# Patient Record
Sex: MALE | Race: WHITE | NOT HISPANIC OR LATINO | URBAN - METROPOLITAN AREA
[De-identification: names, ages, dates, MRNs, and addresses within clinical notes are randomized per-mention and may not be internally consistent; named-entity substitution may affect disease eponyms.]

---

## 2018-03-14 ENCOUNTER — OFFICE VISIT CONVERTED (OUTPATIENT)
Dept: UROLOGY | Facility: CLINIC | Age: 83
End: 2018-03-14
Attending: UROLOGY

## 2018-09-14 ENCOUNTER — OFFICE VISIT CONVERTED (OUTPATIENT)
Dept: UROLOGY | Facility: CLINIC | Age: 83
End: 2018-09-14
Attending: NURSE PRACTITIONER

## 2019-10-14 ENCOUNTER — CONVERSION ENCOUNTER (OUTPATIENT)
Dept: UROLOGY | Facility: CLINIC | Age: 84
End: 2019-10-14

## 2019-10-14 ENCOUNTER — HOSPITAL ENCOUNTER (OUTPATIENT)
Dept: OTHER | Facility: HOSPITAL | Age: 84
Discharge: HOME | End: 2019-10-14
Attending: NURSE PRACTITIONER

## 2019-10-14 ENCOUNTER — OFFICE VISIT CONVERTED (OUTPATIENT)
Dept: UROLOGY | Facility: CLINIC | Age: 84
End: 2019-10-14
Attending: NURSE PRACTITIONER

## 2019-10-14 LAB
BASOPHILS # BLD AUTO: 0.05 10*3/UL (ref 0–0.2)
BASOPHILS NFR BLD AUTO: 0.5 % (ref 0–3)
CONV ABS IMM GRAN: 0.02 10*3/UL (ref 0–0.2)
CONV IMMATURE GRAN: 0.2 % (ref 0–1.8)
DEPRECATED RDW RBC AUTO: 47.4 FL (ref 35.1–43.9)
EOSINOPHIL # BLD AUTO: 0.06 10*3/UL (ref 0–0.7)
EOSINOPHIL # BLD AUTO: 0.6 % (ref 0–7)
ERYTHROCYTE [DISTWIDTH] IN BLOOD BY AUTOMATED COUNT: 12.8 % (ref 11.6–14.4)
FSH SERPL-ACNC: 3.8 M[IU]/ML
HCT VFR BLD AUTO: 43 % (ref 42–52)
HGB BLD-MCNC: 14.1 G/DL (ref 14–18)
LH SERPL-ACNC: 2 M[IU]/ML
LYMPHOCYTES # BLD AUTO: 2.09 10*3/UL (ref 1–5)
LYMPHOCYTES NFR BLD AUTO: 19.6 % (ref 20–45)
MCH RBC QN AUTO: 32.8 PG (ref 27–31)
MCHC RBC AUTO-ENTMCNC: 32.8 G/DL (ref 33–37)
MCV RBC AUTO: 100 FL (ref 80–96)
MONOCYTES # BLD AUTO: 0.9 10*3/UL (ref 0.2–1.2)
MONOCYTES NFR BLD AUTO: 8.5 % (ref 3–10)
NEUTROPHILS # BLD AUTO: 7.53 10*3/UL (ref 2–8)
NEUTROPHILS NFR BLD AUTO: 70.6 % (ref 30–85)
NRBC CBCN: 0 % (ref 0–0.7)
PLATELET # BLD AUTO: 382 10*3/UL (ref 130–400)
PMV BLD AUTO: 9.4 FL (ref 9.4–12.4)
PSA SERPL-MCNC: 4.62 NG/ML (ref 0–4)
RBC # BLD AUTO: 4.3 10*6/UL (ref 4.7–6.1)
WBC # BLD AUTO: 10.65 10*3/UL (ref 4.8–10.8)

## 2019-10-19 LAB
BIOAVAILABLE TESTOSTERONE, %: 16.6 %
CONV TESTOSTERONE, FREE: 40 PG/ML
SHBG SERPL-SCNC: 95.2 NMOL/L
TESTOST FREE MFR SERPL: 0.8 %
TESTOST SERPL-MCNC: 499 NG/DL
TESTOSTERONE.FREE+WB SERPL-MCNC: 83 NG/DL

## 2019-10-23 ENCOUNTER — OFFICE VISIT CONVERTED (OUTPATIENT)
Dept: UROLOGY | Facility: CLINIC | Age: 84
End: 2019-10-23
Attending: UROLOGY

## 2020-01-16 ENCOUNTER — HOSPITAL ENCOUNTER (OUTPATIENT)
Dept: UROLOGY | Facility: CLINIC | Age: 85
Discharge: HOME | End: 2020-01-16
Attending: UROLOGY

## 2020-01-16 LAB — PSA SERPL-MCNC: 3.46 NG/ML (ref 0–4)

## 2020-01-23 ENCOUNTER — OFFICE VISIT CONVERTED (OUTPATIENT)
Dept: UROLOGY | Facility: CLINIC | Age: 85
End: 2020-01-23
Attending: UROLOGY

## 2020-01-23 ENCOUNTER — CONVERSION ENCOUNTER (OUTPATIENT)
Dept: UROLOGY | Facility: CLINIC | Age: 85
End: 2020-01-23

## 2020-07-30 ENCOUNTER — CONVERSION ENCOUNTER (OUTPATIENT)
Dept: UROLOGY | Facility: CLINIC | Age: 85
End: 2020-07-30

## 2020-07-30 ENCOUNTER — OFFICE VISIT CONVERTED (OUTPATIENT)
Dept: UROLOGY | Facility: CLINIC | Age: 85
End: 2020-07-30
Attending: UROLOGY

## 2021-05-13 NOTE — PROGRESS NOTES
Progress Note      Patient Name: Hany Jasmine   Patient ID: [id number]   Sex: Male   YOB: 1935    Primary Care Provider: Barbara aLguna MD   Referring Provider: Barbara Laguna MD    Visit Date: July 30, 2020    Provider: Mario Perales MD   Location: [facility]   Location Address: [address] [place] [address], [address]  [place], [place]  [id number]   Location Phone: [phone]          Chief Complaint  · Recheck prostate      History Of Present Illness  Hany Jasmine is a 84 year old /White male who comes in for a scheduled follow-up visit for BPH.        1/16/20 PSA was 3.46    AUA is 1/35.  Patient is doing good.  Nocturia 0-1.  No frequency in the daytime.  No nausea vomiting and is not losing weight.       Past Medical History  BPH (benign prostatic hyperplasia); ED (erectile dysfunction); Hypogonadism, male; Pancreatic cyst; Renal cancer, right; Renal cyst         Past Surgical History  carpal tunnel; Cholecystectomy; Hernia; Nephrectomy, Patial Right; Prostate Surgery         Medication List  AndroGel 1.62 % (20.25 mg/1.25 gram) transdermal gel in packet; aspirin 81 mg oral tablet,delayed release (DR/EC); doxazosin 8 mg oral tablet; hydrochlorothiazide 25 mg oral tablet; nifedipine 90 mg oral tablet extended release 24hr; Symbicort 160-4.5 mcg/actuation inhalation HFA aerosol inhaler; Tricor 145 mg Oral tablet         Allergy List  NO KNOWN DRUG ALLERGIES       Allergies Reconciled  Family Medical History  *No Known Family History         Social History  Alcohol (Never); Tobacco (Never)         Review of Systems  · Constitutional  o Denies  o : fever, headache, chills  · Eyes  o Denies  o : eye pain, double vision, blurred vision  · HENT  o Denies  o : sinus problems, sore throat, ear infection  · Cardiovascular  o Denies  o : chest pain, high blood pressure, varicosities  · Respiratory  o Admits  o : shortness of breath  o Denies  o : wheezing, frequent  "cough  · Gastrointestinal  o Denies  o : nausea, vomiting, heartburn, indigestion, abdominal pain  · Genitourinary  o Denies  o : urgency, frequency, urinary retention, painful urination  · Integument  o Denies  o : rash, itching, boils  · Neurologic  o Denies  o : tingling or numbness, tremors, dizzy spells  · Musculoskeletal  o Denies  o : joint pain, neck pain, back pain  · Endocrine  o Denies  o : cold intolerance, heat intolerance, tired, excessive thirst, sluggish  · Psychiatric  o Admits  o : feels satisfied with life  o Denies  o : severe depression, concerns with hurting themselves  · Heme-Lymph  o Denies  o : swollen glands, blood clotting problems  · Allergic-Immunologic  o Denies  o : sinus allergy symptoms, hay fever      Vitals  Date Time BP Position Site L\R Cuff Size HR RR TEMP (F) WT  HT  BMI kg/m2 BSA m2 O2 Sat HC       07/30/2020 09:01 AM         190lbs 0oz 5'  6\" 30.67 2     07/30/2020 09:01 /54 Sitting    72 - R  96.9               Physical Examination  · Constitutional  o Appearance  o : 84-year-old white male with little obese and does have some shortness of breath  · Neck  o Thyroid  o : gland size normal, nontender, no nodules or masses present on palpation  · Respiratory  o Respiratory Effort  o : breathing unlabored  o Inspection of Chest  o : normal appearance, no retractions  o Auscultation of Lungs  o : normal breath sounds throughout  · Cardiovascular  o Heart  o :   § Auscultation of Heart  § : regular rate and rhythm, no murmurs, gallops or rubs  o Peripheral Vascular System  o : No abnormalities  · Gastrointestinal  o Liver and spleen  o : no abnormalities  · Genitourinary  o Bladder  o : no abnormalities  o Penis  o : Normal appearance without lesion, discharge or masses.  o Urethral Meatus  o : no abnormalities  o Scrotum and Scrotal Contents  o :   § Scrotum  § : no abnormalities  § Epididymides  § : no abnormalities  § Testes  § : no abnormalities  o Digital Rectal " Examination  o :   § Prostate  § : nontender to palpation, size normal, no nodules present, consistency normal  · Lymphatic  o Groin  o : no lymphadenopathy present, normal size  · Skin and Subcutaneous Tissue  o General Inspection  o : no lesions present, no areas of discoloration, skin turgor normal, texture normal  · Neurologic  o Mental Status Examination  o : grossly oriented to person, place and time  o Gait and Station  o : normal gait, able to stand without difficulty  · Psychiatric  o Mood and Affect  o : mood normal, affect appropriate      Figure 1.0: Pain Rating Scale-[place]         Results  · In-Office Procedures  o Lab procedure  § Automated dipstick urinalysis with microscopy (97523)   § Color Ur: Yellow   § Clarity Ur: Clear   § Glucose Ur Ql Strip: 100 mg/dL   § Bilirub Ur Ql Strip: Negative   § Ketones Ur Ql Strip: Negative   § Sp Gr Ur Qn: 1.025   § Hgb Ur Ql Strip: Negative   § pH Ur-LsCnc: 7.0   § Prot Ur Ql Strip: Negative   § Urobilinogen Ur Strip-mCnc: 1.0 E.U./dL   § Nitrite Ur Ql Strip: Negative   § WBC Est Ur Ql Strip: Negative   § RBC UrnS Qn HPF: 0   § WBC UrnS Qn HPF: 0   § Bacteria UrnS Qn HPF: 0   § Crystals UrnS Qn HPF: 0   § Epithelial Cells (non renal): 0 /HPF  § Epithelial Cells (renal): 0       Assessment  · Hypogonadism, male     257.2/E29.1  · BPH (benign prostatic hyperplasia)     600.00/N40.0    Problems Reconciled  Plan  · Medications  o Medications have been Reconciled  o Transition of Care or Provider Policy  · Instructions  o Patient is doing fine for his age. Informed him and his wife about glycosuria  o . we will recheck him in 6 months time            Electronically Signed by: Mario Perales MD -Author on July 30, 2020 10:27:31 AM

## 2021-05-15 VITALS
DIASTOLIC BLOOD PRESSURE: 56 MMHG | HEIGHT: 66 IN | SYSTOLIC BLOOD PRESSURE: 154 MMHG | WEIGHT: 202 LBS | HEART RATE: 67 BPM | BODY MASS INDEX: 32.47 KG/M2 | TEMPERATURE: 98.5 F

## 2021-05-15 VITALS
SYSTOLIC BLOOD PRESSURE: 152 MMHG | HEIGHT: 66 IN | HEART RATE: 76 BPM | BODY MASS INDEX: 29.73 KG/M2 | DIASTOLIC BLOOD PRESSURE: 69 MMHG | WEIGHT: 185 LBS

## 2021-05-15 VITALS
WEIGHT: 190 LBS | TEMPERATURE: 96.9 F | HEIGHT: 66 IN | SYSTOLIC BLOOD PRESSURE: 151 MMHG | BODY MASS INDEX: 30.53 KG/M2 | DIASTOLIC BLOOD PRESSURE: 54 MMHG | HEART RATE: 72 BPM

## 2021-05-15 VITALS — HEART RATE: 68 BPM | SYSTOLIC BLOOD PRESSURE: 148 MMHG | TEMPERATURE: 98.3 F | DIASTOLIC BLOOD PRESSURE: 61 MMHG

## 2021-05-16 VITALS
BODY MASS INDEX: 31.39 KG/M2 | TEMPERATURE: 98.4 F | WEIGHT: 200 LBS | DIASTOLIC BLOOD PRESSURE: 81 MMHG | HEART RATE: 86 BPM | HEIGHT: 67 IN | SYSTOLIC BLOOD PRESSURE: 163 MMHG

## 2021-05-16 VITALS
HEIGHT: 66 IN | BODY MASS INDEX: 32.14 KG/M2 | HEART RATE: 78 BPM | DIASTOLIC BLOOD PRESSURE: 65 MMHG | TEMPERATURE: 98.9 F | WEIGHT: 200 LBS | SYSTOLIC BLOOD PRESSURE: 161 MMHG

## 2021-05-19 ENCOUNTER — OFFICE VISIT CONVERTED (OUTPATIENT)
Dept: UROLOGY | Facility: CLINIC | Age: 86
End: 2021-05-19
Attending: UROLOGY

## 2021-05-19 ENCOUNTER — CONVERSION ENCOUNTER (OUTPATIENT)
Dept: UROLOGY | Facility: CLINIC | Age: 86
End: 2021-05-19

## 2021-05-19 LAB
BILIRUB UR QL STRIP: NEGATIVE
COLOR UR: YELLOW
CONV BACTERIA IN URINE MICRO: 0
CONV CALCIUM OXALATE CRYSTALS /HPF IN URINE SEDIMENT BY MICROSCOPY: 0
CONV CLARITY OF URINE: CLEAR
CONV PROTEIN IN URINE BY AUTOMATED TEST STRIP: NEGATIVE
CONV UROBILINOGEN IN URINE BY AUTOMATED TEST STRIP: NORMAL
GLUCOSE UR QL: NEGATIVE
HGB UR QL STRIP: NEGATIVE
KETONES UR QL STRIP: NEGATIVE
LEUKOCYTE ESTERASE UR QL STRIP: NEGATIVE
NITRITE UR QL STRIP: NEGATIVE
PH UR STRIP.AUTO: 5.5 [PH]
RBC #/AREA URNS HPF: 0 /[HPF]
RENAL EPI CELLS #/AREA URNS HPF: 0 /[HPF]
SP GR UR: 1.02
SQUAMOUS SPT QL MICRO: 0
WBC #/AREA URNS HPF: 0 /[HPF]

## 2021-06-05 NOTE — PROGRESS NOTES
"   Progress Note      Patient Name: Hany Jasmine   Patient ID: [id number]   Sex: Male   YOB: 1935    Primary Care Provider: Barbara Laguna MD   Referring Provider: Barbara Laguna MD    Visit Date: May 19, 2021    Provider: Mario Perales MD   Location: [facility]   Location Address: [address] [place] [address], [address] [place] [address]  [id number]   Location Phone: [phone]          Chief Complaint  · \"Returning for a prostate check\"      History Of Present Illness  The patient is a 85 year old /White male , who presents for a follow up for BPH. Patient is urinating with no problem. He gets up only once at nighttime. No burning in his stream is good           Past Medical History  BPH (benign prostatic hyperplasia); ED (erectile dysfunction); Hypogonadism, male; Pancreatic cyst; Renal cancer, right; Renal cyst         Past Surgical History  carpal tunnel; Cholecystectomy; Hernia; Nephrectomy, Patial Right; Prostate Surgery         Medication List  AndroGel 1.62 % (20.25 mg/1.25 gram) transdermal gel in packet; aspirin 81 mg oral tablet,delayed release (DR/EC); doxazosin 8 mg oral tablet; hydrochlorothiazide 25 mg oral tablet; nifedipine 90 mg oral tablet extended release 24hr; Symbicort 160-4.5 mcg/actuation inhalation HFA aerosol inhaler; Tricor 145 mg Oral tablet         Allergy List  NO KNOWN DRUG ALLERGIES         Family Medical History  *No Known Family History         Social History  Alcohol (Never); Tobacco (Never)         Review of Systems  · Constitutional  o Denies  o : fatigue, night sweats  · Eyes  o Denies  o : double vision, blurred vision  · HENT  o Denies  o : vertigo, recent head injury  · Breasts  o Denies  o : abnormal changes in breast size, additional breast symptoms except as noted in the HPI  · Cardiovascular  o Denies  o : chest pain, irregular heart beats  · Respiratory  o Denies  o : shortness of breath, productive cough  · Gastrointestinal  o Denies  o : " "nausea, vomiting  · Genitourinary  o Admits  o : nocturia  o Denies  o : urgency, frequency, dysuria, hematuria, incontinence, urinary retention, decreased stream, post-void dribbling  · Integument  o Denies  o : hair growth change, new skin lesions  · Neurologic  o Denies  o : altered mental status, seizures  · Musculoskeletal  o Denies  o : joint swelling, limitation of motion  · Endocrine  o Denies  o : cold intolerance, heat intolerance  · Heme-Lymph  o Denies  o : petechiae, lymph node enlargement or tenderness  · Allergic-Immunologic  o Denies  o : frequent illnesses      Vitals  Date Time BP Position Site L\R Cuff Size HR RR TEMP (F) WT  HT  BMI kg/m2 BSA m2 O2 Sat FR L/min FiO2 HC       05/19/2021 03:01 /54 Sitting    75 - R  97.6 201lbs 8oz 5'  6\" 32.52 2.06             Physical Examination  · Constitutional  o Appearance  o : Well nourished, well developed patient in no acute distress. Ambulating without difficulty.  · Neck  o Thyroid  o : Normal size without tenderness, nodules or masses  · Respiratory  o Respiratory Effort  o : Breathing is unlabored without accessory muscle use  o Inspection of Chest  o : normal appearance, no retractions  o Auscultation of Lungs  o : Normal breath sounds  · Cardiovascular  o Heart  o :   § Auscultation of Heart  § : regular rate and rhythm, no murmurs, gallops or rubs  o Peripheral Vascular System  o : No abnormalities  · Gastrointestinal  o Abdominal Examination  o : Scaphoid abdomen which is non-tender to palpation with normal tone and without rigidity or guarding. Normal bowel sounds. No masses present.  o Liver and spleen  o : No hepatomegaly present. Liver is non-tender to palpation and spleen is not palpable.  o Hernias  o : No abdominal wall hernias are present.  · Genitourinary  o Bladder  o : no abnormalities  o Penis  o : Normal appearance without lesion, discharge or masses.  o Urethral Meatus  o : no abnormalities  o Scrotum and Scrotal Contents  o : "   § Scrotum  § : no abnormalities  § Epididymides  § : no abnormalities  § Testes  § : no abnormalities  o Digital Rectal Examination  o :   § Prostate  § : nontender to palpation, size normal, no nodules present, consistency normal  · Skin and Subcutaneous Tissue  o General Inspection  o : No rashes, lesions or areas of discoloration present. Skin turgor is normal.  o General Palpation  o : No abnormalities, masses or tenderness on palpation.  · Neurologic  o Mental Status Examination  o : grossly oriented to person, place and time  o Gait and Station  o : normal gait, able to stand without difficulty  · Psychiatric  o Mood and Affect  o : mood normal, affect appropriate      Figure 1.0: Pain Rating Scale-[place]         Results  · In-Office Procedures  o Lab procedure  § Automated dipstick urinalysis with microscopy (22144)   § Color Ur: Yellow   § Clarity Ur: Clear   § Glucose Ur Ql Strip: Negative   § Bilirub Ur Ql Strip: Negative   § Ketones Ur Ql Strip: Negative   § Sp Gr Ur Qn: 1.025   § Hgb Ur Ql Strip: Negative   § pH Ur-LsCnc: 5.5   § Prot Ur Ql Strip: Negative   § Urobilinogen Ur Strip-mCnc: 0.2 E.U./dL   § Nitrite Ur Ql Strip: Negative   § WBC Est Ur Ql Strip: Negative   § RBC UrnS Qn HPF: 0   § WBC UrnS Qn HPF: 0   § Bacteria UrnS Qn HPF: 0   § Crystals UrnS Qn HPF: 0   § Epithelial Cells (non renal): 0 /HPF  § Epithelial Cells (renal): 0       Assessment  · BPH with Urinary Obstruction       Benign prostatic hyperplasia with lower urinary tract symptoms     600.01/N40.1  Other obstructive and reflux uropathy     600.01/N13.8      Plan  · Medications  o Medications have been Reconciled  o Transition of Care or Provider Policy  · Instructions  o Patient is doing fine and we will recheck him in 6 months with PSA. His PSA was elevated last year  o Electronically Identified Patient Education Materials Provided Electronically            Electronically Signed by: Mario Perales MD -Author on May 19, 2021  03:23:10 PM

## 2021-07-15 VITALS
TEMPERATURE: 97.6 F | BODY MASS INDEX: 32.38 KG/M2 | HEART RATE: 75 BPM | SYSTOLIC BLOOD PRESSURE: 146 MMHG | HEIGHT: 66 IN | WEIGHT: 201.5 LBS | DIASTOLIC BLOOD PRESSURE: 54 MMHG

## 2021-11-19 ENCOUNTER — OFFICE VISIT (OUTPATIENT)
Dept: UROLOGY | Facility: CLINIC | Age: 86
End: 2021-11-19

## 2021-11-19 VITALS
DIASTOLIC BLOOD PRESSURE: 58 MMHG | HEIGHT: 66 IN | TEMPERATURE: 97.6 F | HEART RATE: 79 BPM | SYSTOLIC BLOOD PRESSURE: 145 MMHG | WEIGHT: 201 LBS | BODY MASS INDEX: 32.3 KG/M2

## 2021-11-19 DIAGNOSIS — N40.0 BENIGN PROSTATIC HYPERPLASIA, UNSPECIFIED WHETHER LOWER URINARY TRACT SYMPTOMS PRESENT: Primary | ICD-10-CM

## 2021-11-19 DIAGNOSIS — Z85.528 HISTORY OF KIDNEY CANCER: ICD-10-CM

## 2021-11-19 LAB
BACTERIA UR QL AUTO: NORMAL /HPF
BILIRUB BLD-MCNC: NEGATIVE MG/DL
CLARITY, POC: CLEAR
COLOR UR: YELLOW
EPI CELLS #/AREA URNS HPF: 0 /[HPF]
GLUCOSE UR STRIP-MCNC: NEGATIVE MG/DL
KETONES UR QL: NEGATIVE
LEUKOCYTE EST, POC: NEGATIVE
NITRITE UR-MCNC: NEGATIVE MG/ML
PH UR: 7 [PH] (ref 5–8)
PROT UR STRIP-MCNC: NEGATIVE MG/DL
RBC # UR STRIP: NEGATIVE /UL
RBC # UR STRIP: NORMAL /HPF
RENAL EPITHELIAL, POC: 0
SP GR UR: 1.02 (ref 1–1.03)
UNIDENT CRYS URNS QL MICRO: NORMAL /HPF
UROBILINOGEN UR QL: NORMAL
WBC # UR STRIP: NORMAL /HPF

## 2021-11-19 PROCEDURE — 81003 URINALYSIS AUTO W/O SCOPE: CPT | Performed by: UROLOGY

## 2021-11-19 PROCEDURE — 99213 OFFICE O/P EST LOW 20 MIN: CPT | Performed by: UROLOGY

## 2021-11-19 RX ORDER — DOXAZOSIN 8 MG/1
TABLET ORAL
COMMUNITY

## 2021-11-19 RX ORDER — NIFEDIPINE 90 MG/1
TABLET, EXTENDED RELEASE ORAL
COMMUNITY

## 2021-11-19 RX ORDER — HYDROCHLOROTHIAZIDE 25 MG/1
TABLET ORAL
COMMUNITY

## 2021-11-19 RX ORDER — GLIPIZIDE 5 MG/1
TABLET ORAL
COMMUNITY
Start: 2021-09-27

## 2021-11-19 RX ORDER — ASPIRIN 81 MG/1
TABLET ORAL
COMMUNITY

## 2021-11-19 RX ORDER — CARVEDILOL 6.25 MG/1
TABLET ORAL
COMMUNITY
Start: 2021-10-08

## 2021-11-19 RX ORDER — ALBUTEROL SULFATE 90 UG/1
AEROSOL, METERED RESPIRATORY (INHALATION)
COMMUNITY
Start: 2021-09-04

## 2021-11-19 RX ORDER — BUDESONIDE AND FORMOTEROL FUMARATE DIHYDRATE 160; 4.5 UG/1; UG/1
AEROSOL RESPIRATORY (INHALATION)
COMMUNITY

## 2021-11-19 RX ORDER — FENOFIBRATE 160 MG/1
TABLET ORAL
COMMUNITY
Start: 2021-09-23

## 2021-11-19 RX ORDER — FENOFIBRATE 145 MG/1
TABLET, COATED ORAL
COMMUNITY

## 2021-11-19 NOTE — PROGRESS NOTES
"Chief Complaint  Benign Prostatic Hypertrophy    Follow-up for chronic disease of BPH    Subjective No acute problems   {Problem List  Visit Diagnosis   Encounters  Notes  Medications  Labs  Result Review Imaging  Media :23}     Hany Jasmine presents to [facility]  History of Present Illness    Patient is doing very well and has no nocturia. She has no frequency or dysuria. No gross hematuria. His AUA score is 6/35. Patient was on androgens which increased his PSA but he is off testosterone at this time.    Patient has history of right kidney cancer which was removed several years ago and is in remission    Objective No acute distress  Vital Signs:   /58   Pulse 79   Temp 97.6 °F (36.4 °C)   Ht 167.6 cm (66\")   Wt 91.2 kg (201 lb)   BMI 32.44 kg/m²     No Known Allergies   Review of Systems no change from last time    Physical Exam  Constitutional:       General: He is not in acute distress.     Appearance: Normal appearance. He is obese. He is not ill-appearing, toxic-appearing or diaphoretic.   HENT:      Head: Normocephalic and atraumatic.   Eyes:      Pupils: Pupils are equal, round, and reactive to light.   Cardiovascular:      Rate and Rhythm: Normal rate and regular rhythm.      Pulses: Normal pulses.      Heart sounds: Normal heart sounds. No murmur heard.      Pulmonary:      Effort: Pulmonary effort is normal. No respiratory distress.      Breath sounds: Normal breath sounds. No rhonchi or rales.   Abdominal:      General: Abdomen is flat. There is no distension.      Palpations: Abdomen is soft.      Tenderness: There is no abdominal tenderness. There is no right CVA tenderness, left CVA tenderness or guarding.   Genitourinary:     Penis: Normal.       Testes: Normal.      Prostate: Normal.      Rectum: Normal.   Musculoskeletal:         General: No swelling. Normal range of motion.      Cervical back: Normal range of motion and neck supple. No rigidity or " tenderness.   Skin:     General: Skin is warm.      Coloration: Skin is not jaundiced.   Neurological:      General: No focal deficit present.      Mental Status: He is alert and oriented to person, place, and time.      Motor: No weakness.      Gait: Gait normal.   Psychiatric:         Mood and Affect: Mood normal.         Behavior: Behavior normal.         Thought Content: Thought content normal.         Judgment: Judgment normal.        Result Review :                 Assessment and Plan    Diagnoses and all orders for this visit:    1. Benign prostatic hyperplasia, unspecified whether lower urinary tract symptoms present (Primary)  -     POC Urinalysis Dipstick    2. History of kidney cancer    Patient is in remission and does not have any voiding difficulties. We will recheck him in 6 months to make sure his obstructive uropathy is not any worse and is now developing prostate cancer    Follow Up   No follow-ups on file.  Patient was given instructions and counseling regarding his condition or for health maintenance advice. Please see specific information pulled into the AVS if appropriate.     Mario Perales MD

## 2022-01-01 ENCOUNTER — OFFICE VISIT (OUTPATIENT)
Dept: UROLOGY | Facility: CLINIC | Age: 87
End: 2022-01-01

## 2022-01-01 VITALS
TEMPERATURE: 98.7 F | HEIGHT: 66 IN | HEART RATE: 70 BPM | DIASTOLIC BLOOD PRESSURE: 65 MMHG | BODY MASS INDEX: 32.47 KG/M2 | WEIGHT: 202 LBS | SYSTOLIC BLOOD PRESSURE: 142 MMHG

## 2022-01-01 DIAGNOSIS — N40.0 BENIGN PROSTATIC HYPERPLASIA, UNSPECIFIED WHETHER LOWER URINARY TRACT SYMPTOMS PRESENT: Primary | ICD-10-CM

## 2022-01-01 DIAGNOSIS — Z85.528 HISTORY OF KIDNEY CANCER: ICD-10-CM

## 2022-01-01 LAB
BILIRUB BLD-MCNC: NEGATIVE MG/DL
CLARITY, POC: CLEAR
COLOR UR: YELLOW
GLUCOSE UR STRIP-MCNC: ABNORMAL MG/DL
KETONES UR QL: NEGATIVE
LEUKOCYTE EST, POC: NEGATIVE
NITRITE UR-MCNC: NEGATIVE MG/ML
PH UR: 7 [PH] (ref 5–8)
PROT UR STRIP-MCNC: NEGATIVE MG/DL
RBC # UR STRIP: NEGATIVE /UL
SP GR UR: 1.02 (ref 1–1.03)
UROBILINOGEN UR QL: NORMAL

## 2022-01-01 PROCEDURE — 99212 OFFICE O/P EST SF 10 MIN: CPT | Performed by: UROLOGY

## 2022-01-01 PROCEDURE — 81002 URINALYSIS NONAUTO W/O SCOPE: CPT | Performed by: UROLOGY

## 2022-05-19 NOTE — PROGRESS NOTES
"Chief Complaint  Follow-up with history of kidney cancer    BPH with obstructive uropathy    Subjective  Patient is doing fine        Hany Jasmine presents to [facility]  History of Present Illness    Patient had a right renal cancer and underwent radical nephrectomy almost more than 20 years ago and is in remission.    Patient has been urinating very well and is on doxazosin 8 mg daily.  Is urinating without any difficulties and no dysuria with gross hematuria.    Objective No acute distress  Vital Signs:   /65   Pulse 70   Temp 98.7 °F (37.1 °C)   Ht 167.6 cm (66\")   Wt 91.6 kg (202 lb)   BMI 32.60 kg/m²     No Known Allergies   Past medical history:  has a past medical history of BPH (benign prostatic hyperplasia), ED (erectile dysfunction), Hypogonadism male, Pancreatic cyst (02/12/2015), Renal cancer, right (HCC), and Renal cyst (02/12/2015).   Past surgical history:  has a past surgical history that includes Prostate surgery (2011); Carpal tunnel release; Cholecystectomy; Hernia repair; and Partial nephrectomy (Right, 03/19/2007).  Personal history: family history is not on file.  Social history:  reports that he has never smoked. He has never used smokeless tobacco. Drug use questions deferred to the physician. He reports that he does not drink alcohol.    Review of Systems    No change from before    Physical Exam  Constitutional:       General: He is not in acute distress.     Appearance: Normal appearance. He is obese. He is not ill-appearing or toxic-appearing.   HENT:      Head: Normocephalic and atraumatic.      Ears:      Comments: Patient is hard of hearing  Cardiovascular:      Rate and Rhythm: Normal rate and regular rhythm.      Pulses: Normal pulses.      Heart sounds: Normal heart sounds. No murmur heard.  Pulmonary:      Effort: Pulmonary effort is normal. No respiratory distress.      Breath sounds: Normal breath sounds. No rhonchi or rales.   Abdominal: "      General: There is no distension.      Palpations: Abdomen is soft. There is no mass.      Tenderness: There is left CVA tenderness. There is no right CVA tenderness or guarding.   Genitourinary:     Penis: Normal.       Testes: Normal.      Prostate: Normal.   Musculoskeletal:         General: No swelling. Normal range of motion.      Cervical back: Normal range of motion and neck supple. No rigidity or tenderness.   Lymphadenopathy:      Cervical: No cervical adenopathy.   Skin:     General: Skin is warm.      Coloration: Skin is not jaundiced.   Neurological:      General: No focal deficit present.      Mental Status: He is alert and oriented to person, place, and time.      Motor: No weakness.      Gait: Gait normal.   Psychiatric:         Mood and Affect: Mood normal.         Behavior: Behavior normal.         Thought Content: Thought content normal.         Judgment: Judgment normal.        Result Review :                 Assessment and Plan    Diagnoses and all orders for this visit:    1. Benign prostatic hyperplasia, unspecified whether lower urinary tract symptoms present (Primary)  -     POC Urinalysis Dipstick    2. History of kidney cancer      Will continue with doxazosin 8 mg daily for BPH and recheck him in 1 year's time  Brief Urine Lab Results  (Last result in the past 365 days)      Color   Clarity   Blood   Leuk Est   Nitrite   Protein   CREAT   Urine HCG        05/19/22 1132 Yellow   Clear   Negative   Negative   Negative   Negative                  Follow Up   No follow-ups on file.  Patient was given instructions and counseling regarding his condition or for health maintenance advice. Please see specific information pulled into the AVS if appropriate.     Mario Perales MD

## 2022-12-08 ENCOUNTER — TELEPHONE (OUTPATIENT)
Dept: UROLOGY | Facility: CLINIC | Age: 87
End: 2022-12-08

## 2022-12-08 NOTE — TELEPHONE ENCOUNTER
“Please be informed that patient has passed. Patient has been marked  in the system. The date of death is: 2022    Caller: CHIKIS CHU    Relationship: SON    Best call back number: [phone]